# Patient Record
Sex: FEMALE | Race: WHITE | NOT HISPANIC OR LATINO | URBAN - METROPOLITAN AREA
[De-identification: names, ages, dates, MRNs, and addresses within clinical notes are randomized per-mention and may not be internally consistent; named-entity substitution may affect disease eponyms.]

---
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
## 2022-02-11 VITALS
HEART RATE: 87 BPM | BODY MASS INDEX: 20.97 KG/M2 | TEMPERATURE: 98.3 F | HEIGHT: 67 IN | WEIGHT: 133.6 LBS | SYSTOLIC BLOOD PRESSURE: 112 MMHG | OXYGEN SATURATION: 98 % | DIASTOLIC BLOOD PRESSURE: 70 MMHG

## 2022-02-16 NOTE — NURSING NOTE
CAGE Assessment Entered On:  10/29/2019 10:23 AM CDT    Performed On:  10/29/2019 10:23 AM CDT by Marcela Washington CMA               Assessment   Have you ever felt you should cut down on your drinking :   No   Have people annoyed you by criticizing your drinking :   No   Have you ever felt bad or guilty about your drinking :   No   Have you ever taken a drink first thing in the morning to steady your nerves or get rid of a hangover (Eye-opener) :   No   CAGE Score :   0    Marcela Washington CMA - 10/29/2019 10:23 AM CDT

## 2022-02-16 NOTE — NURSING NOTE
Rapid Strep POC Entered On:  10/29/2019 9:26 AM CDT    Performed On:  10/29/2019 9:25 AM CDT by Schoenike , Andrea               Rapid Strep POC   Rapid Strep POC :   Negative   POC Test Comments :   S/O Culture   Schoenike , Andrea - 10/29/2019 9:25 AM CDT   Details   Collection Date :   10/29/2019 9:20 AM CDT   Handling Specimen POC :   Throat   POC Test Comments :   Lab Test Preformed by:   [facility]  [address] [place] [id number]  Phone: [phone]  Fax: [phone]     Schoenike , Andrea - 10/29/2019 9:25 AM CDT

## 2022-02-16 NOTE — NURSING NOTE
Depression Screening Entered On:  10/29/2019 10:23 AM CDT    Performed On:  10/29/2019 10:22 AM CDT by Marcela Washington CMA               Depression Screening   Little Interest - Pleasure in Activities :   Not at all   Feeling Down, Depressed, Hopeless :   Not at all   Initial Depression Screen Score :   0    Trouble Falling or Staying Asleep :   Several days   Feeling Tired or Little Energy :   Several days   Poor Appetite or Overeating :   Not at all   Feeling Bad About Yourself :   Not at all   Trouble Concentrating :   Not at all   Moving or Speaking Slowly :   Not at all   Thoughts Better Off Dead or Hurting Self :   Not at all   Detailed Depression Screen Score :   2    Total Depression Screen Score :   2    OPAL Difficulty with Work, Home, Others :   Not difficult at all   Marcela Washington CMA - 10/29/2019 10:22 AM CDT

## 2022-02-16 NOTE — NURSING NOTE
Comprehensive Intake Entered On:  10/29/2019 9:11 AM CDT    Performed On:  10/29/2019 9:07 AM CDT by Marcela Washington CMA               Summary   Chief Complaint :   New Patient; possible strep throat; cough; runny nose;   Last Menstrual Period :   10/8/2019 CDT   Weight Measured :   133.6 lb(Converted to: 133 lb 10 oz, 60.60 kg)    Height Measured :   66.5 in(Converted to: 5 ft 6 in, 168.91 cm)    Body Mass Index :   21.24 kg/m2   Body Surface Area :   1.68 m2   Systolic Blood Pressure :   112 mmHg   Diastolic Blood Pressure :   70 mmHg   Mean Arterial Pressure :   84 mmHg   Peripheral Pulse Rate :   87 bpm   Temperature Tympanic :   98.3 DegF(Converted to: 36.8 DegC)    Oxygen Saturation :   98 %   Marcela Washington CMA - 10/29/2019 9:07 AM CDT   Health Status   Allergies Verified? :   Yes   Medication History Verified? :   Yes   Medical History Verified? :   Yes   Pre-Visit Planning Status :   Completed   Tobacco Use? :   Never smoker   Marcela Washington CMA - 10/29/2019 9:07 AM CDT   Demographics   Last Name :   Jose A   Address :   [address]   First Name :   Keyana Nova Initial :   A   Responsible Party Date of Birth () :   2000 CDT   City :   [place]   State :   [place]   Zip Code :   [address]   Contact Relationship to Patient (other) :   Patient   Marcela Washington CMA - 10/29/2019 10:24 AM CDT   Consents   Consent for Immunization Exchange :   Consent Granted   Consent for Immunizations to Providers :   Consent Granted   Marcela Washington CMA - 10/29/2019 9:07 AM CDT   Meds / Allergies   (As Of: 10/29/2019 9:11:54 AM CDT)   Allergies (Active)   No Known Medication Allergies  Estimated Onset Date:   Unspecified ; Created By:   Marcela Washington CMA; Reaction Status:   Active ; Category:   Drug ; Substance:   No Known Medication Allergies ; Type:   Allergy ; Updated By:   Marcela Washington CMA; Reviewed Date:   10/29/2019 9:09 AM CDT        Medication List   (As Of: 10/29/2019 9:11:54 AM CDT)   No Known Home  Medications     Padmini ESTRADA, Marcela - 10/29/2019 9:09:55 AM

## 2022-02-16 NOTE — PROGRESS NOTES
Patient:   SELWYN CASTILLO            MRN: [id number]            FIN: [id number]               Age:   19 years     Sex:  Female     :  2000   Associated Diagnoses:   Sore throat; Acute recurrent maxillary sinusitis   Author:   Uriel Martinez PA-C      Report Summary   Diagnosis  Sore throat ([id number] J02.9).  Patient InstructionsOrders   Visit Information      Date of Service: 10/29/2019 09:00 am  Performing Location: [facility]  Encounter#: [id number]      Primary Care Provider (PCP):  NONE ,       Referring Provider:  Uriel Martinez PA-C    NPI# [id number]   Visit type:  New symptom.    Source of history:  Self.    Referral source:  Self.    History limitation:  None.       Chief Complaint   10/29/2019 9:07 AM CDT   New Patient; possible strep throat; cough; runny nose;        History of Present Illness             The patient presents with sinus problem.  The sinus problem is located in the maxillary sinus.  The sinus problem is characterized by nasal congestion, rhinorrhea and facial pain.  The severity of the sinus problem is moderate.  The sinus problem is episodic, fluctuates in intensity and is worsening.  The sinus problem has lasted for 1.5 week(s).  Associated symptoms consist of cough and sore throat.  CC above noted and confirmed with the patient..        Review of Systems   Constitutional:  Chills.    Eye:  Negative.    Ear/Nose/Mouth/Throat:  Negative except as documented in history of present illness.    Respiratory:  Negative except as documented in history of present illness.    Cardiovascular:  Negative.    Gastrointestinal:  Negative.    Genitourinary:  Negative.    Hematology/Lymphatics:  Negative.    Endocrine:  Negative.    Immunologic:  Negative.    Musculoskeletal:  Negative.    Integumentary:  Negative.    Neurologic:  Negative.    Psychiatric:  Anxiety.       Health Status   Allergies:    Allergic Reactions (All)  No Known Medication Allergies   Medications:   (Selected)   Prescriptions  Prescribed  amoxicillin 500 mg oral capsule: = 1 cap(s) ( 500 mg ), PO, TID, # 30 cap(s), 0 Refill(s), Type: Maintenance,    Medications          *denotes recorded medication          amoxicillin 500 mg oral capsule: 500 mg, 1 cap(s), PO, TID, for 10 day(s), 30 cap(s), 0 Refill(s).       Problem list:    No problem items selected or recorded.      Histories   Past Medical History:    No active or resolved past medical history items have been selected or recorded.   Family History:    No family history items have been selected or recorded.   Procedure history:    No active procedure history items have been selected or recorded.   Social History:             No active social history items have been recorded.      Physical Examination   Vital Signs   10/29/2019 9:07 AM CDT Temperature Tympanic 98.3 DegF    Peripheral Pulse Rate 87 bpm    Systolic Blood Pressure 112 mmHg    Diastolic Blood Pressure 70 mmHg    Mean Arterial Pressure 84 mmHg    Oxygen Saturation 98 %      Measurements from flowsheet : Measurements   10/29/2019 9:07 AM CDT Height Measured - Standard 66.5 in    Weight Measured - Standard 133.6 lb    BSA 1.68 m2    Body Mass Index 21.24 kg/m2    Body Mass Index Percentile 45.59      General:  Alert and oriented, No acute distress.    Eye:  Pupils are equal, round and reactive to light, Extraocular movements are intact, Normal conjunctiva.    HENT:  Normocephalic, Tympanic membranes are clear, Oral mucosa is moist.         Nose: Both nostrils, Discharge ( Moderate amount, Green ).    Neck:  Supple, Non-tender, No lymphadenopathy.    Respiratory:  Lungs are clear to auscultation, Respirations are non-labored.    Cardiovascular:  Normal rate, Regular rhythm, No murmur.       Review / Management   Results review:  Lab results: 10/29/2019 9:25 AM CDT   Rapid Strep POC           Negative  .       Impression and Plan   Diagnosis     Sore throat ([id number] J02.9).     Acute recurrent  maxillary sinusitis ([id number] J01.01).     Patient Instructions:       Counseled: Patient, Regarding diagnosis, Regarding medications, Activity, Verbalized understanding.    Orders     Orders (Selected)   Prescriptions  Prescribed  amoxicillin 500 mg oral capsule: = 1 cap(s) ( 500 mg ), PO, TID, # 30 cap(s), 0 Refill(s), Type: Maintenance.     Take medicine as prescribed, side effects discussed.  Tylenol/ibuprofen for fever and discomfort.  Push fluids.  RTC if not improving in 36-48 hours, prior if concerns as we have discussed.